# Patient Record
Sex: FEMALE | Race: OTHER | URBAN - METROPOLITAN AREA
[De-identification: names, ages, dates, MRNs, and addresses within clinical notes are randomized per-mention and may not be internally consistent; named-entity substitution may affect disease eponyms.]

---

## 2019-07-17 ENCOUNTER — HOSPITAL ENCOUNTER (OUTPATIENT)
Age: 59
Setting detail: SPECIMEN
Discharge: HOME | End: 2019-07-17

## 2019-07-17 LAB
DIRECT EXAM: NORMAL
Lab: NORMAL
SPECIMEN DESCRIPTION: NORMAL

## 2019-07-19 LAB
HPV SAMPLE: ABNORMAL
HPV, GENOTYPE 16: NOT DETECTED
HPV, GENOTYPE 18: NOT DETECTED
HPV, HIGH RISK OTHER: DETECTED
HPV, INTERPRETATION: ABNORMAL
SPECIMEN DESCRIPTION: ABNORMAL

## 2019-07-23 LAB — CYTOLOGY REPORT: NORMAL

## 2019-08-02 ENCOUNTER — HOSPITAL ENCOUNTER (EMERGENCY)
Age: 59
Discharge: HOME | End: 2019-08-02

## 2019-08-02 VITALS
WEIGHT: 123 LBS | SYSTOLIC BLOOD PRESSURE: 116 MMHG | TEMPERATURE: 97.7 F | DIASTOLIC BLOOD PRESSURE: 65 MMHG | RESPIRATION RATE: 14 BRPM | OXYGEN SATURATION: 99 % | HEART RATE: 58 BPM | HEIGHT: 60 IN | BODY MASS INDEX: 24.15 KG/M2

## 2019-08-02 DIAGNOSIS — M79.2 NEURALGIA: Primary | ICD-10-CM

## 2019-08-02 PROCEDURE — 99282 EMERGENCY DEPT VISIT SF MDM: CPT

## 2019-08-02 RX ORDER — NAPROXEN 500 MG/1
500 TABLET ORAL 2 TIMES DAILY
Qty: 60 TABLET | Refills: 0 | Status: SHIPPED | OUTPATIENT
Start: 2019-08-02 | End: 2022-08-30 | Stop reason: ALTCHOICE

## 2019-08-02 ASSESSMENT — ENCOUNTER SYMPTOMS
SORE THROAT: 1
EYE ITCHING: 0
RHINORRHEA: 0
NAUSEA: 0
FACIAL SWELLING: 0
TROUBLE SWALLOWING: 1
EYE DISCHARGE: 0
VOICE CHANGE: 0
VOMITING: 0
PHOTOPHOBIA: 0
EYE PAIN: 0

## 2019-08-02 NOTE — ED PROVIDER NOTES
[facility]  eMERGENCY dEPARTMENT eNCOUnter          CHIEF COMPLAINT       Chief Complaint   Patient presents with    Eye Problem       Nurses Notes reviewed and I agree except as noted in the HPI. HISTORY OF PRESENT ILLNESS    Dilia Hager is a 62 y.o. female who presents to the Emergency Department for the evaluation of pain below the left eye. She states that she has a cyst removed from the left eye 1 week ago and has been experiencing pain ever since. She also admits to some decreased vision from the left eye as well as a headache. She also complains of a sore throat and difficulty swallowing. She denies taking any pain medications prior to arrival.       The HPI was provided by the patient. REVIEW OF SYSTEMS     Review of Systems   Constitutional: Negative for chills, diaphoresis, fatigue and fever. HENT: Positive for sore throat and trouble swallowing. Negative for dental problem, ear discharge, ear pain, facial swelling, mouth sores, postnasal drip, rhinorrhea and voice change. Eyes: Positive for visual disturbance (Decreased vision). Negative for photophobia, pain, discharge and itching. Pain below to the left eye   Gastrointestinal: Negative for nausea and vomiting. Neurological: Positive for headaches. Negative for dizziness and syncope. PAST MEDICAL HISTORY    has no past medical history on file. SURGICAL HISTORY      has no past surgical history on file. CURRENT MEDICATIONS       Discharge Medication List as of 8/2/2019  7:02 PM          ALLERGIES     has No Known Allergies. FAMILY HISTORY     has no family status information on file. family history is not on file. SOCIAL HISTORY          PHYSICAL EXAM     INITIAL VITALS:  height is 5' (1.524 m) and weight is 123 lb (55.8 kg). Her oral temperature is 97.7 °F (36.5 °C). Her blood pressure is 116/65 and her pulse is 58. Her respiration is 14 and oxygen saturation is 99%.     Physical

## 2019-10-01 ENCOUNTER — NURSE ONLY (OUTPATIENT)
Dept: LAB | Age: 59
End: 2019-10-01

## 2019-10-01 ENCOUNTER — OFFICE VISIT (OUTPATIENT)
Dept: FAMILY MEDICINE CLINIC | Age: 59
End: 2019-10-01

## 2019-10-01 VITALS
RESPIRATION RATE: 12 BRPM | HEIGHT: 61 IN | SYSTOLIC BLOOD PRESSURE: 110 MMHG | DIASTOLIC BLOOD PRESSURE: 60 MMHG | OXYGEN SATURATION: 98 % | BODY MASS INDEX: 24.2 KG/M2 | WEIGHT: 128.2 LBS | HEART RATE: 52 BPM

## 2019-10-01 DIAGNOSIS — Z13.220 ENCOUNTER FOR SCREENING FOR LIPID DISORDER: ICD-10-CM

## 2019-10-01 DIAGNOSIS — R10.11 RUQ ABDOMINAL PAIN: ICD-10-CM

## 2019-10-01 DIAGNOSIS — E55.9 VITAMIN D DEFICIENCY: ICD-10-CM

## 2019-10-01 DIAGNOSIS — K90.89 OTHER INTESTINAL MALABSORPTION: ICD-10-CM

## 2019-10-01 DIAGNOSIS — G89.29 CHRONIC PAIN OF BOTH KNEES: ICD-10-CM

## 2019-10-01 DIAGNOSIS — R10.9 CHRONIC ABDOMINAL PAIN: ICD-10-CM

## 2019-10-01 DIAGNOSIS — G89.29 CHRONIC ABDOMINAL PAIN: ICD-10-CM

## 2019-10-01 DIAGNOSIS — M25.561 CHRONIC PAIN OF BOTH KNEES: ICD-10-CM

## 2019-10-01 DIAGNOSIS — R09.89 GLOBUS SENSATION: ICD-10-CM

## 2019-10-01 DIAGNOSIS — Z01.89 ENCOUNTER FOR ROUTINE LABORATORY TESTING: ICD-10-CM

## 2019-10-01 DIAGNOSIS — R13.10 DYSPHAGIA, UNSPECIFIED TYPE: ICD-10-CM

## 2019-10-01 DIAGNOSIS — Z13.29 THYROID DISORDER SCREENING: ICD-10-CM

## 2019-10-01 DIAGNOSIS — M25.562 CHRONIC PAIN OF BOTH KNEES: ICD-10-CM

## 2019-10-01 DIAGNOSIS — R13.10 DYSPHAGIA, UNSPECIFIED TYPE: Primary | ICD-10-CM

## 2019-10-01 LAB
ALBUMIN SERPL-MCNC: 4.6 G/DL (ref 3.5–5.1)
ALP BLD-CCNC: 55 U/L (ref 38–126)
ALT SERPL-CCNC: 16 U/L (ref 11–66)
AMYLASE: 80 U/L (ref 20–104)
ANION GAP SERPL CALCULATED.3IONS-SCNC: 13 MEQ/L (ref 8–16)
AST SERPL-CCNC: 24 U/L (ref 5–40)
BASOPHILS # BLD: 1.4 %
BASOPHILS ABSOLUTE: 0.1 THOU/MM3 (ref 0–0.1)
BILIRUB SERPL-MCNC: 0.3 MG/DL (ref 0.3–1.2)
BUN BLDV-MCNC: 9 MG/DL (ref 7–22)
CALCIUM SERPL-MCNC: 9.5 MG/DL (ref 8.5–10.5)
CHLORIDE BLD-SCNC: 104 MEQ/L (ref 98–111)
CHOLESTEROL, TOTAL: 225 MG/DL (ref 100–199)
CO2: 26 MEQ/L (ref 23–33)
CREAT SERPL-MCNC: 0.5 MG/DL (ref 0.4–1.2)
EOSINOPHIL # BLD: 2.7 %
EOSINOPHILS ABSOLUTE: 0.1 THOU/MM3 (ref 0–0.4)
ERYTHROCYTE [DISTWIDTH] IN BLOOD BY AUTOMATED COUNT: 13.8 % (ref 11.5–14.5)
ERYTHROCYTE [DISTWIDTH] IN BLOOD BY AUTOMATED COUNT: 47.2 FL (ref 35–45)
GFR SERPL CREATININE-BSD FRML MDRD: > 90 ML/MIN/1.73M2
GLUCOSE BLD-MCNC: 103 MG/DL (ref 70–108)
HCT VFR BLD CALC: 39.3 % (ref 37–47)
HDLC SERPL-MCNC: 44 MG/DL
HEMOGLOBIN: 12.9 GM/DL (ref 12–16)
IMMATURE GRANS (ABS): 0.01 THOU/MM3 (ref 0–0.07)
IMMATURE GRANULOCYTES: 0.2 %
LDL CHOLESTEROL CALCULATED: 144 MG/DL
LIPASE: 24.4 U/L (ref 5.6–51.3)
LYMPHOCYTES # BLD: 47.1 %
LYMPHOCYTES ABSOLUTE: 2.3 THOU/MM3 (ref 1–4.8)
MAGNESIUM: 2.4 MG/DL (ref 1.6–2.4)
MCH RBC QN AUTO: 30.9 PG (ref 26–33)
MCHC RBC AUTO-ENTMCNC: 32.8 GM/DL (ref 32.2–35.5)
MCV RBC AUTO: 94 FL (ref 81–99)
MONOCYTES # BLD: 9.7 %
MONOCYTES ABSOLUTE: 0.5 THOU/MM3 (ref 0.4–1.3)
NUCLEATED RED BLOOD CELLS: 0 /100 WBC
PLATELET # BLD: 291 THOU/MM3 (ref 130–400)
PMV BLD AUTO: 11.7 FL (ref 9.4–12.4)
POTASSIUM SERPL-SCNC: 3.9 MEQ/L (ref 3.5–5.2)
RBC # BLD: 4.18 MILL/MM3 (ref 4.2–5.4)
SEG NEUTROPHILS: 38.9 %
SEGMENTED NEUTROPHILS ABSOLUTE COUNT: 1.9 THOU/MM3 (ref 1.8–7.7)
SODIUM BLD-SCNC: 143 MEQ/L (ref 135–145)
TOTAL PROTEIN: 7.2 G/DL (ref 6.1–8)
TRIGL SERPL-MCNC: 185 MG/DL (ref 0–199)
TSH SERPL DL<=0.05 MIU/L-ACNC: 2.74 UIU/ML (ref 0.4–4.2)
VITAMIN D 25-HYDROXY: 16 NG/ML (ref 30–100)
WBC # BLD: 4.8 THOU/MM3 (ref 4.8–10.8)

## 2019-10-01 PROCEDURE — 99203 OFFICE O/P NEW LOW 30 MIN: CPT | Performed by: NURSE PRACTITIONER

## 2019-10-01 RX ORDER — OMEPRAZOLE 20 MG/1
20 CAPSULE, DELAYED RELEASE ORAL DAILY
Qty: 90 CAPSULE | Refills: 1 | Status: SHIPPED | OUTPATIENT
Start: 2019-10-01 | End: 2020-01-10 | Stop reason: SDUPTHER

## 2019-10-01 SDOH — HEALTH STABILITY: MENTAL HEALTH: HOW OFTEN DO YOU HAVE A DRINK CONTAINING ALCOHOL?: NEVER

## 2019-10-01 ASSESSMENT — PATIENT HEALTH QUESTIONNAIRE - PHQ9
SUM OF ALL RESPONSES TO PHQ QUESTIONS 1-9: 0
SUM OF ALL RESPONSES TO PHQ QUESTIONS 1-9: 0
1. LITTLE INTEREST OR PLEASURE IN DOING THINGS: 0
SUM OF ALL RESPONSES TO PHQ9 QUESTIONS 1 & 2: 0
2. FEELING DOWN, DEPRESSED OR HOPELESS: 0

## 2019-10-01 ASSESSMENT — ENCOUNTER SYMPTOMS
ABDOMINAL DISTENTION: 0
COUGH: 0
RHINORRHEA: 0
SHORTNESS OF BREATH: 0
NAUSEA: 0
BACK PAIN: 0
ABDOMINAL PAIN: 1
RECTAL PAIN: 0
DIARRHEA: 0
VOMITING: 0
CONSTIPATION: 1
BLOOD IN STOOL: 0
SORE THROAT: 0
TROUBLE SWALLOWING: 0

## 2019-10-03 LAB — THYROXINE (T4): 7.2 UG/DL (ref 4.5–12)

## 2019-10-10 ENCOUNTER — TELEPHONE (OUTPATIENT)
Dept: FAMILY MEDICINE CLINIC | Age: 59
End: 2019-10-10

## 2020-01-10 ENCOUNTER — HOSPITAL ENCOUNTER (OUTPATIENT)
Age: 60
Discharge: HOME | End: 2020-01-10

## 2020-01-10 ENCOUNTER — HOSPITAL ENCOUNTER (OUTPATIENT)
Dept: GENERAL RADIOLOGY | Age: 60
Discharge: HOME | End: 2020-01-10

## 2020-01-10 ENCOUNTER — OFFICE VISIT (OUTPATIENT)
Dept: FAMILY MEDICINE CLINIC | Age: 60
End: 2020-01-10

## 2020-01-10 VITALS
OXYGEN SATURATION: 100 % | WEIGHT: 131.2 LBS | SYSTOLIC BLOOD PRESSURE: 98 MMHG | HEIGHT: 61 IN | HEART RATE: 61 BPM | RESPIRATION RATE: 16 BRPM | TEMPERATURE: 97.9 F | DIASTOLIC BLOOD PRESSURE: 62 MMHG | BODY MASS INDEX: 24.77 KG/M2

## 2020-01-10 PROCEDURE — 74018 RADEX ABDOMEN 1 VIEW: CPT

## 2020-01-10 PROCEDURE — 99214 OFFICE O/P EST MOD 30 MIN: CPT | Performed by: FAMILY MEDICINE

## 2020-01-10 RX ORDER — OMEPRAZOLE 20 MG/1
20 CAPSULE, DELAYED RELEASE ORAL DAILY
Qty: 90 CAPSULE | Refills: 1 | Status: SHIPPED | OUTPATIENT
Start: 2020-01-10 | End: 2022-06-28 | Stop reason: ALTCHOICE

## 2020-01-10 ASSESSMENT — ENCOUNTER SYMPTOMS
ABDOMINAL PAIN: 1
CONSTIPATION: 0
SHORTNESS OF BREATH: 0
BLOOD IN STOOL: 0
TROUBLE SWALLOWING: 1
COUGH: 0
EYE PAIN: 0
DIARRHEA: 0
VOMITING: 0
NAUSEA: 0

## 2020-01-10 ASSESSMENT — PATIENT HEALTH QUESTIONNAIRE - PHQ9
SUM OF ALL RESPONSES TO PHQ QUESTIONS 1-9: 0
1. LITTLE INTEREST OR PLEASURE IN DOING THINGS: 0
SUM OF ALL RESPONSES TO PHQ9 QUESTIONS 1 & 2: 0
SUM OF ALL RESPONSES TO PHQ QUESTIONS 1-9: 0
2. FEELING DOWN, DEPRESSED OR HOPELESS: 0

## 2020-01-10 NOTE — PROGRESS NOTES
S: 62 y.o. female with   Chief Complaint   Patient presents with    Abdominal Pain     Patient here to discuss abdominal pains and also having a throat issue while lying on back feels like there is a puncture or hole at roof of mouth       HPI: chornic abd pain x 10 yrs. Colonoscopy unclear of results. Possible ascending colitis. Was told to repeat colonoscopy in 5 yrs.  kub and gi referral ordered but not done. Tenderness. No change with eating. No meds for pain. Right paracentral abd pain. Karen 8 years ago. Avoids meat. Mostly eats avacado, lettuce, and beans. BP Readings from Last 3 Encounters:   01/10/20 98/62   10/01/19 110/60     Wt Readings from Last 3 Encounters:   01/10/20 131 lb 3.2 oz (59.5 kg)   10/01/19 128 lb 3.2 oz (58.2 kg)           O: VS:  height is 5' 0.5\" (1.537 m) and weight is 131 lb 3.2 oz (59.5 kg). Her oral temperature is 97.9 °F (36.6 °C). Her blood pressure is 98/62 and her pulse is 61. Her respiration is 16 and oxygen saturation is 100%. AAO/NAD, appropriate affect for mood  CV:  RRR, no murmur  Resp: CTAB  RUQ TTP. No rebound. Soft abd. Diagnosis Orders   1. Right upper quadrant abdominal pain         Plan:  KUB, GI referral for colonoscopy. Start omeprazole for tx and dx potential.  F/u 1 month. Possible neurological pain. Consider topamax or elavil in the future if EGD, colonoscopy, kub, and omeprazole don't elucidate an answer. Health Maintenance Due   Topic Date Due    Hepatitis C screen  1960    DTaP/Tdap/Td vaccine (1 - Tdap) 07/10/1969    HIV screen  07/10/1973    Cervical cancer screen  07/10/1979    Diabetes screen  07/10/1998    Breast cancer screen  07/10/2008    Shingles Vaccine (1 of 2) 07/10/2008    Colon cancer screen colonoscopy  07/10/2008    Flu vaccine (1) 09/01/2019         Attending Physician Statement  I have discussed the case, including pertinent history and exam findings with the resident.  I also have seen the patient and performed bain portions of the examination. I agree with the documented assessment and plan as documented by the resident.   GE modifier added to this encounter      Payal Slaughter DO 1/10/2020 11:21 AM

## 2020-01-10 NOTE — PROGRESS NOTES
[address] [facility] [address]  Dept: [phone]  Dept Fax: [phone]  Loc: [phone]      July Hart is a 62 y.o. female who presents today for:  Chief Complaint   Patient presents with    Abdominal Pain     Patient here to discuss abdominal pains and also having a throat issue while lying on back feels like there is a puncture or hole at roof of mouth       Goals      Exercise 3x per week (30 min per time)           HPI:     July Hart is a 62 y.o. female who has no past medical history who presents today for follow up. She is a Maori speaking individual and communication is established through an interpretor. Stomach pain on right side for 10 years, Everyday, Constant, does not change with food or eating. States that her inflammation goes all the way from her pelvis to her bra. 5 years ago she states that she had a colonoscopy which showed colon inflammation and she was recommended to get a repeat colonoscopy in 5 years. She states that her diet is fairly balanced and that she usually eats avocados, beans, and lettuce. She states that she had her gall bladder taken out around 8 years ago. She states that she has a BM once a day and that she doesn't notice any change in her stools. Not more firm or loose. No pain on the left side of her abdomen or pain with BM. Was last seen in October for this same issue, at that time KUB was ordered and she was referred to GI. She was not seen for either of those appointments likely due to lack of communication. She also notes that she does have some difficulty swallowing some foods and has had that issue for quite some time. Did not try omeprazole that was prescribed at the last visit. History reviewed. No pertinent past medical history. History reviewed. No pertinent surgical history. History reviewed. No pertinent family history.   Social kg)     BP Readings from Last 3 Encounters:   01/10/20 98/62   10/01/19 110/60       Physical Exam  Vitals signs and nursing note reviewed. Constitutional:       General: She is not in acute distress. Appearance: She is well-developed. She is not diaphoretic. HENT:      Head: Normocephalic and atraumatic. Right Ear: External ear normal.      Left Ear: External ear normal.      Nose: Nose normal.   Eyes:      General: No scleral icterus. Right eye: No discharge. Left eye: No discharge. Conjunctiva/sclera: Conjunctivae normal.   Neck:      Musculoskeletal: Normal range of motion. Cardiovascular:      Rate and Rhythm: Normal rate and regular rhythm. Heart sounds: Normal heart sounds. No murmur. Pulmonary:      Effort: Pulmonary effort is normal.      Breath sounds: Normal breath sounds. Abdominal:      General: Abdomen is flat. Bowel sounds are normal. There is no distension. Palpations: There is no mass. Tenderness: There is tenderness. There is guarding. Hernia: No hernia is present. Skin:     General: Skin is warm and dry. Findings: No erythema or rash. Neurological:      Mental Status: She is alert and oriented to person, place, and time. Cranial Nerves: No cranial nerve deficit. Psychiatric:         Behavior: Behavior normal.         Thought Content:  Thought content normal.         Judgment: Judgment normal.         Lab Results   Component Value Date    WBC 4.8 10/01/2019    HGB 12.9 10/01/2019    HCT 39.3 10/01/2019     10/01/2019    CHOL 225 (H) 10/01/2019    TRIG 185 10/01/2019    HDL 44 10/01/2019    LDLCALC 144 10/01/2019    AST 24 10/01/2019     10/01/2019    K 3.9 10/01/2019     10/01/2019    CREATININE 0.5 10/01/2019    BUN 9 10/01/2019    CO2 26 10/01/2019    TSH 2.740 10/01/2019    LABGLOM >90 10/01/2019    MG 2.4 10/01/2019    CALCIUM 9.5 10/01/2019    VITD25 16 (L) 10/01/2019       Imaging Results:    No

## 2020-01-17 ENCOUNTER — TELEPHONE (OUTPATIENT)
Dept: FAMILY MEDICINE CLINIC | Age: 60
End: 2020-01-17

## 2020-01-17 NOTE — TELEPHONE ENCOUNTER
----- Message from Emanuel Hendrix DO sent at 1/17/2020 11:14 AM EST -----  XR is generally normal. May be having some constipation. Try dietary modifications with prune juice or OTC medications such as fiber or miralax.

## 2020-01-17 NOTE — TELEPHONE ENCOUNTER
Call to patient, no answer, unable to lm d/t vm not set up  No f/u appt scheduled   Letter mailed to patient

## 2020-01-17 NOTE — LETTER
[address] [place] [address] [address] [facility] [address]  [facility] [address]  Phone: [phone]  Fax: Ricardo Mann, DO        January 17, 2020      Our office has been trying to contact you pertaining to your most recent test results. Please contact our office at your first convenience.  You can reach us at [phone] Monday through Friday from 8:00 am to 4:00 pm.          Sincerely,        [facility], DO

## 2020-01-23 ENCOUNTER — TELEPHONE (OUTPATIENT)
Dept: FAMILY MEDICINE CLINIC | Age: 60
End: 2020-01-23

## 2020-01-23 NOTE — TELEPHONE ENCOUNTER
Chhaya from [facility] Lm on nurse line asking if this office has any english speaking contacts listed in this patient's chart besides the information they have. I returned Chhaya's call and lm at her ext.

## 2022-06-28 ENCOUNTER — HOSPITAL ENCOUNTER (OUTPATIENT)
Age: 62
Setting detail: SPECIMEN
Discharge: HOME | End: 2022-06-28

## 2022-06-28 ENCOUNTER — ANCILLARY PROCEDURE (OUTPATIENT)
Dept: FAMILY MEDICINE CLINIC | Age: 62
End: 2022-06-28

## 2022-06-28 ENCOUNTER — OFFICE VISIT (OUTPATIENT)
Dept: FAMILY MEDICINE CLINIC | Age: 62
End: 2022-06-28

## 2022-06-28 VITALS
OXYGEN SATURATION: 98 % | DIASTOLIC BLOOD PRESSURE: 80 MMHG | TEMPERATURE: 97.4 F | SYSTOLIC BLOOD PRESSURE: 138 MMHG | BODY MASS INDEX: 25.57 KG/M2 | WEIGHT: 135.4 LBS | HEIGHT: 61 IN | HEART RATE: 58 BPM | RESPIRATION RATE: 16 BRPM

## 2022-06-28 DIAGNOSIS — G89.29 CHRONIC PAIN OF LEFT KNEE: ICD-10-CM

## 2022-06-28 DIAGNOSIS — Z00.00 ENCOUNTER FOR WELLNESS EXAMINATION IN ADULT: Primary | ICD-10-CM

## 2022-06-28 DIAGNOSIS — E55.9 VITAMIN D DEFICIENCY: ICD-10-CM

## 2022-06-28 DIAGNOSIS — M25.562 CHRONIC PAIN OF LEFT KNEE: ICD-10-CM

## 2022-06-28 DIAGNOSIS — Z00.00 ENCOUNTER FOR WELLNESS EXAMINATION IN ADULT: ICD-10-CM

## 2022-06-28 DIAGNOSIS — R06.83 SNORING: ICD-10-CM

## 2022-06-28 DIAGNOSIS — Z12.11 COLON CANCER SCREENING: ICD-10-CM

## 2022-06-28 PROCEDURE — 73562 X-RAY EXAM OF KNEE 3: CPT

## 2022-06-28 PROCEDURE — 73562 X-RAY EXAM OF KNEE 3: CPT | Performed by: NURSE PRACTITIONER

## 2022-06-28 PROCEDURE — 36415 COLL VENOUS BLD VENIPUNCTURE: CPT | Performed by: STUDENT IN AN ORGANIZED HEALTH CARE EDUCATION/TRAINING PROGRAM

## 2022-06-28 PROCEDURE — 99396 PREV VISIT EST AGE 40-64: CPT | Performed by: STUDENT IN AN ORGANIZED HEALTH CARE EDUCATION/TRAINING PROGRAM

## 2022-06-28 SDOH — ECONOMIC STABILITY: FOOD INSECURITY: WITHIN THE PAST 12 MONTHS, YOU WORRIED THAT YOUR FOOD WOULD RUN OUT BEFORE YOU GOT MONEY TO BUY MORE.: NEVER TRUE

## 2022-06-28 SDOH — ECONOMIC STABILITY: TRANSPORTATION INSECURITY
IN THE PAST 12 MONTHS, HAS LACK OF TRANSPORTATION KEPT YOU FROM MEETINGS, WORK, OR FROM GETTING THINGS NEEDED FOR DAILY LIVING?: YES

## 2022-06-28 SDOH — ECONOMIC STABILITY: TRANSPORTATION INSECURITY
IN THE PAST 12 MONTHS, HAS THE LACK OF TRANSPORTATION KEPT YOU FROM MEDICAL APPOINTMENTS OR FROM GETTING MEDICATIONS?: YES

## 2022-06-28 SDOH — ECONOMIC STABILITY: FOOD INSECURITY: WITHIN THE PAST 12 MONTHS, THE FOOD YOU BOUGHT JUST DIDN'T LAST AND YOU DIDN'T HAVE MONEY TO GET MORE.: NEVER TRUE

## 2022-06-28 ASSESSMENT — ENCOUNTER SYMPTOMS
TROUBLE SWALLOWING: 0
VOMITING: 0
SHORTNESS OF BREATH: 0
COUGH: 0
ABDOMINAL PAIN: 0
NAUSEA: 0

## 2022-06-28 ASSESSMENT — PATIENT HEALTH QUESTIONNAIRE - PHQ9
SUM OF ALL RESPONSES TO PHQ QUESTIONS 1-9: 0
1. LITTLE INTEREST OR PLEASURE IN DOING THINGS: 0
SUM OF ALL RESPONSES TO PHQ QUESTIONS 1-9: 0
SUM OF ALL RESPONSES TO PHQ9 QUESTIONS 1 & 2: 0
2. FEELING DOWN, DEPRESSED OR HOPELESS: 0
SUM OF ALL RESPONSES TO PHQ QUESTIONS 1-9: 0
SUM OF ALL RESPONSES TO PHQ QUESTIONS 1-9: 0

## 2022-06-28 ASSESSMENT — SOCIAL DETERMINANTS OF HEALTH (SDOH): HOW HARD IS IT FOR YOU TO PAY FOR THE VERY BASICS LIKE FOOD, HOUSING, MEDICAL CARE, AND HEATING?: NOT HARD AT ALL

## 2022-06-28 NOTE — PROGRESS NOTES
[address] [facility],[address] [facility], [facility]. [place] [address]  Dept: [phone]  Dept Fax: [phone]: [phone]  Loc Fax: [phone]    Mc Toney is a 59 y.o. female who presents today for her medical conditions/complaints as noted below. Chief Complaint   Patient presents with    New Patient     [facility]- Previous [facility] CNP patient    Knee Pain     Left Knee x 3 years off an on. back of leg and knee--Right knee also swollen and some pain. Taking Advil    Other      ID: [id number]       HPI:     Visit completed with assistance from [facility] [place] interpretor - needs  for all communication. Patient is here in the office today to establish care. Patient complains of chronic left knee pain and snoring/sinus issues. Left knee pain:  Patient states that she has had left knee pain for 3 years. Pain is constant but does get worse at times-unable to tell me exactly what makes the pain worse. Her pain is located across the top of her knee and extends down below. Denies any clicking, popping, catching, or instability. States that she does have some swelling that occurs from time to time, but denies any redness. She does take Advil occasionally which does help. Denies any known injury or trauma. Has not had x-rays in the past.    Snoring, sinus issues:  Patient states that she has issues with snoring and difficulty breathing due to sinus congestion when lying on her back flat. States that this also happens while awake during the day. She denies any trouble swallowing. She does not use any nasal sprays. Has not had a sleep study. States that she thinks it is due to something in her throat and would like to see a specialist for evaluation. Past medical history: low vitamin D, chronic left knee pain    Surgeries: cholecystectomy    Social history:   Patient lives in [place] with her daughter.   Moved recently She is well-developed and overweight. She is not ill-appearing or toxic-appearing. HENT:      Head: Normocephalic and atraumatic. Right Ear: Tympanic membrane, ear canal and external ear normal.      Left Ear: Tympanic membrane, ear canal and external ear normal.      Nose: Nose normal.      Right Turbinates: Not enlarged. Left Turbinates: Not enlarged. Mouth/Throat:      Lips: Pink. Mouth: Mucous membranes are moist.      Dentition: Has dentures. Pharynx: Oropharynx is clear. Uvula midline. Eyes:      General: Lids are normal.      Conjunctiva/sclera: Conjunctivae normal.      Pupils: Pupils are equal, round, and reactive to light. Neck:      Thyroid: No thyromegaly. Cardiovascular:      Rate and Rhythm: Normal rate and regular rhythm. Pulses:           Radial pulses are 2+ on the right side and 2+ on the left side. Heart sounds: Normal heart sounds. No murmur heard. Pulmonary:      Effort: Pulmonary effort is normal.      Breath sounds: Normal breath sounds and air entry. No wheezing, rhonchi or rales. Abdominal:      General: Abdomen is flat. Bowel sounds are normal. There is no distension. Palpations: Abdomen is soft. Tenderness: There is no abdominal tenderness. Musculoskeletal:      Cervical back: Neck supple. Right knee: Normal.      Left knee: No swelling, effusion or erythema. Tenderness present over the medial joint line and lateral joint line. No LCL laxity or MCL laxity. Instability Tests: Anterior drawer test negative. Posterior drawer test negative. Lymphadenopathy:      Cervical: No cervical adenopathy. Skin:     General: Skin is warm and dry. Capillary Refill: Capillary refill takes less than 2 seconds. Neurological:      Mental Status: She is alert and oriented to person, place, and time. Gait: Gait is intact.    Psychiatric:         Mood and Affect: Mood and affect normal.         Behavior: Behavior is cooperative. /80 (Site: Left Upper Arm, Position: Sitting, Cuff Size: Medium Adult) Comment: manual  Pulse 58   Temp 97.4 °F (36.3 °C) (Temporal)   Resp 16   Ht 5' 1\" (1.549 m)   Wt 135 lb 6.4 oz (61.4 kg)   LMP  (LMP Unknown)   SpO2 98%   BMI 25.58 kg/m²     Assessment/Plan:   Adry Riojas was seen today for new patient, knee pain and other. Diagnoses and all orders for this visit:    Encounter for wellness examination in adult  Comments:  Well-appearing 59year old female. Declines HIV, hep C screening. Declines Pap exam, mammogram today. Referral placed to GI for screening colonscopy. Lab work ordered - will complete today. Orders:  -     Comprehensive Metabolic Panel; Future  -     Lipid Panel; Future    Vitamin D deficiency  Comments:  History of low vitamin D. Recheck vitamin D level at this time. Not currently on replacement. Orders:  -     Vitamin D 25 Hydroxy; Future    Chronic pain of left knee  Comments:  Chronic, uncontrolled. Atraumatic. Likely secondary to OA. Obtain baseline knee xray at this time. Continue NSAIDs as needed for pain. Orders:  -     Cancel: XR KNEE LEFT (1-2 VIEWS); Future    Snoring  Comments:  Chronic, uncontrolled. Physical exam unremarkable today. Patient declines sleep study at this time but requests referral to specialist for further evaluation. Orders:  -     Amor Denise MD, Otolaryngology, [facility]    Colon cancer screening  Comments:  Referral to GI placed for screening colonoscopy. Orders:  -     Mignon Perez MD, Gastroenterology, [facility]      Return in about 4 weeks (around 7/26/2022) for follow up on lab work.     Electronically signed by Renny Kapadia DO on 6/28/2022 at 4:17 PM

## 2022-06-29 LAB
ALBUMIN SERPL-MCNC: 4.6 G/DL (ref 3.5–5.2)
ALBUMIN/GLOBULIN RATIO: 1.7 (ref 1–2.5)
ALP BLD-CCNC: 70 U/L (ref 35–104)
ALT SERPL-CCNC: 19 U/L (ref 5–33)
ANION GAP SERPL CALCULATED.3IONS-SCNC: 12 MMOL/L (ref 9–17)
AST SERPL-CCNC: 25 U/L
BILIRUB SERPL-MCNC: 0.3 MG/DL (ref 0.3–1.2)
BUN BLDV-MCNC: 10 MG/DL (ref 8–23)
CALCIUM SERPL-MCNC: 10 MG/DL (ref 8.6–10.4)
CHLORIDE BLD-SCNC: 102 MMOL/L (ref 98–107)
CHOLESTEROL/HDL RATIO: 5.5
CHOLESTEROL: 237 MG/DL
CO2: 27 MMOL/L (ref 20–31)
CREAT SERPL-MCNC: 0.57 MG/DL (ref 0.5–0.9)
GFR AFRICAN AMERICAN: >60 ML/MIN
GFR NON-AFRICAN AMERICAN: >60 ML/MIN
GFR SERPL CREATININE-BSD FRML MDRD: NORMAL ML/MIN/{1.73_M2}
GLUCOSE BLD-MCNC: 88 MG/DL (ref 70–99)
HDLC SERPL-MCNC: 43 MG/DL
LDL CHOLESTEROL: 154 MG/DL (ref 0–130)
POTASSIUM SERPL-SCNC: 4.7 MMOL/L (ref 3.7–5.3)
SODIUM BLD-SCNC: 141 MMOL/L (ref 135–144)
TOTAL PROTEIN: 7.3 G/DL (ref 6.4–8.3)
TRIGL SERPL-MCNC: 202 MG/DL
VITAMIN D 25-HYDROXY: 13.4 NG/ML

## 2022-07-14 PROBLEM — E55.9 VITAMIN D DEFICIENCY: Status: ACTIVE | Noted: 2022-07-14

## 2022-07-14 PROBLEM — E78.2 MIXED HYPERLIPIDEMIA: Status: ACTIVE | Noted: 2022-07-14

## 2022-07-14 NOTE — PROGRESS NOTES
[address] [facility],[address] FAMILY MEDICINE, [facility]. [facility] [address]  Dept: [phone]  Dept Fax: : [phone]  [facility] Fax: [phone]    Alexa Austin is a 57 y.o. female who presents today for her medical conditions/complaints as noted below. Chief Complaint   Patient presents with    Results     Recent labs done 06/28/22  Xray done 06/28/22         HPI:     Patient presents office today for a follow-up on labs and x-ray results. Labs show elevated cholesterol and low vitamin D. Left knee x-ray showed mild bicompartmental arthritic changes. Hyperlipidemia:  Patient has not been taking any cholesterol medication. ASCVD risk is 7.1%. Patient agreeable to starting medication at this time. Vitamin D deficiency:  Vitamin D level was low at 13. She is not currently taking any supplementation. Interested in starting vitamin D supplement at this time. Left knee pain/osteoarthritis:  Patient states that she continues to have left knee pain that extends and involves her entire knee. Does have some swelling from time to time along the inner aspect. She does not take anything for her pain. Does not do any at home exercises/stretches. Has not done PT. GI referral: Gastroenterology office was contacted during this appointment and confirmed that patient was mailed a screening form that she must complete and return prior to scheduling an appointment. ENT: appt scheduled for 8/30/2022      History reviewed. No pertinent past medical history. History reviewed. No pertinent surgical history. History reviewed. No pertinent family history. Social History     Tobacco Use    Smoking status: Never    Smokeless tobacco: Never   Substance Use Topics    Alcohol use: Never      Current Outpatient Medications   Medication Sig Dispense Refill    vitamin D (CHOLECALCIFEROL) 50 MCG (2000 UT) TABS tablet Take 1 tablet by mouth in the morning.  27 tablet 2    atorvastatin (LIPITOR) 40 MG tablet Take 1 tablet by mouth in the morning. 30 tablet 2     No current facility-administered medications for this visit. No Known Allergies    Health Maintenance   Topic Date Due    COVID-19 Vaccine (1) Never done    DTaP/Tdap/Td vaccine (1 - Tdap) Never done    Cervical cancer screen  Never done    Colorectal Cancer Screen  Never done    Breast cancer screen  Never done    Shingles vaccine (1 of 2) Never done    Hepatitis C screen  06/28/2023 (Originally 7/10/1978)    HIV screen  06/28/2023 (Originally 7/10/1973)    Flu vaccine (1) 09/01/2022    Lipids  06/28/2023    Depression Screen  06/28/2023    Hepatitis A vaccine  Aged Out    Hepatitis B vaccine  Aged Out    Hib vaccine  Aged Out    Meningococcal (ACWY) vaccine  Aged Out    Pneumococcal 0-64 years Vaccine  Aged Out       Subjective:      Review of Systems   Constitutional:  Negative for chills and fever. Respiratory:  Negative for cough and shortness of breath. Cardiovascular:  Negative for chest pain. Gastrointestinal:  Negative for abdominal pain, nausea and vomiting. Musculoskeletal:  Positive for arthralgias (left knee pain) and joint swelling. Objective:     Physical Exam  Vitals and nursing note reviewed. Constitutional:       General: She is not in acute distress. Appearance: Normal appearance. She is well-developed. She is not ill-appearing or toxic-appearing. HENT:      Head: Normocephalic and atraumatic. Eyes:      General: Lids are normal.      Conjunctiva/sclera: Conjunctivae normal.   Cardiovascular:      Rate and Rhythm: Normal rate and regular rhythm. Pulses:           Radial pulses are 2+ on the right side and 2+ on the left side. Heart sounds: Normal heart sounds. No murmur heard. Pulmonary:      Effort: Pulmonary effort is normal.      Breath sounds: Normal breath sounds and air entry. No wheezing, rhonchi or rales.    Musculoskeletal:      Right lower leg: No edema. Left lower leg: No edema. Comments: Left knee: crepitus on exam, no appreciable swelling noted   Skin:     General: Skin is warm and dry. Capillary Refill: Capillary refill takes less than 2 seconds. Neurological:      General: No focal deficit present. Mental Status: She is alert and oriented to person, place, and time. Gait: Gait is intact. Psychiatric:         Mood and Affect: Mood and affect normal.         Behavior: Behavior is cooperative. /73   Pulse 60   Temp 98.4 °F (36.9 °C) (Temporal)   Resp 16   Wt 133 lb (60.3 kg)   LMP  (LMP Unknown)   SpO2 98%   BMI 25.13 kg/m²     Assessment/Plan:   Carl Lal was seen today for results. Diagnoses and all orders for this visit:    Mixed hyperlipidemia  Comments:  Chronic, uncontrolled. ASCVD risk is 7.1%. Plan to start atorvastatin 40 mg daily. Will plan to recheck lipid panel in 6 months. Orders:  -     atorvastatin (LIPITOR) 40 MG tablet; Take 1 tablet by mouth in the morning. Vitamin D deficiency  Comments:  Chronic, uncontrolled. Vitamin D low at 13. Start vitamin D 2000 units daily - Rx sent to pharmacy. Plan to recheck lab in 3-6 months. Orders:  -     vitamin D (CHOLECALCIFEROL) 50 MCG (2000 UT) TABS tablet; Take 1 tablet by mouth in the morning. Primary osteoarthritis of left knee  Comments:  New diagnosis, uncontrolled pain. Plan to start as needed NSAID (Aleve or Naproxen) OTC. At-home knee exercises and stretches provided in AVS. Contact office with any worsening symptoms or concerns. Return in about 3 months (around 10/15/2022) for follow up on medication.       Electronically signed by Shae Winters DO on 7/15/2022 at 12:21 PM

## 2022-07-15 ENCOUNTER — OFFICE VISIT (OUTPATIENT)
Dept: FAMILY MEDICINE CLINIC | Age: 62
End: 2022-07-15

## 2022-07-15 VITALS
WEIGHT: 133 LBS | OXYGEN SATURATION: 98 % | BODY MASS INDEX: 25.13 KG/M2 | SYSTOLIC BLOOD PRESSURE: 124 MMHG | TEMPERATURE: 98.4 F | RESPIRATION RATE: 16 BRPM | DIASTOLIC BLOOD PRESSURE: 73 MMHG | HEART RATE: 60 BPM

## 2022-07-15 DIAGNOSIS — M17.12 PRIMARY OSTEOARTHRITIS OF LEFT KNEE: ICD-10-CM

## 2022-07-15 DIAGNOSIS — E55.9 VITAMIN D DEFICIENCY: ICD-10-CM

## 2022-07-15 DIAGNOSIS — E78.2 MIXED HYPERLIPIDEMIA: Primary | ICD-10-CM

## 2022-07-15 PROCEDURE — 99214 OFFICE O/P EST MOD 30 MIN: CPT | Performed by: STUDENT IN AN ORGANIZED HEALTH CARE EDUCATION/TRAINING PROGRAM

## 2022-07-15 RX ORDER — CHOLECALCIFEROL (VITAMIN D3) 50 MCG
2000 TABLET ORAL DAILY
Qty: 30 TABLET | Refills: 2 | Status: SHIPPED | OUTPATIENT
Start: 2022-07-15 | End: 2022-08-10

## 2022-07-15 RX ORDER — ATORVASTATIN CALCIUM 40 MG/1
40 TABLET, FILM COATED ORAL DAILY
Qty: 30 TABLET | Refills: 2 | Status: SHIPPED | OUTPATIENT
Start: 2022-07-15 | End: 2022-08-10

## 2022-07-15 ASSESSMENT — ENCOUNTER SYMPTOMS
ABDOMINAL PAIN: 0
VOMITING: 0
NAUSEA: 0
COUGH: 0
SHORTNESS OF BREATH: 0

## 2022-08-10 DIAGNOSIS — E78.2 MIXED HYPERLIPIDEMIA: ICD-10-CM

## 2022-08-10 DIAGNOSIS — E55.9 VITAMIN D DEFICIENCY: ICD-10-CM

## 2022-08-10 RX ORDER — CHOLECALCIFEROL (VITAMIN D3) 125 MCG
CAPSULE ORAL
Qty: 30 TABLET | Refills: 2 | Status: SHIPPED | OUTPATIENT
Start: 2022-08-10

## 2022-08-10 RX ORDER — ATORVASTATIN CALCIUM 40 MG/1
TABLET, FILM COATED ORAL
Qty: 30 TABLET | Refills: 2 | Status: SHIPPED | OUTPATIENT
Start: 2022-08-10

## 2022-08-30 ENCOUNTER — OFFICE VISIT (OUTPATIENT)
Dept: ENT CLINIC | Age: 62
End: 2022-08-30

## 2022-08-30 VITALS
OXYGEN SATURATION: 97 % | HEART RATE: 55 BPM | TEMPERATURE: 97.1 F | DIASTOLIC BLOOD PRESSURE: 90 MMHG | BODY MASS INDEX: 25.11 KG/M2 | RESPIRATION RATE: 18 BRPM | SYSTOLIC BLOOD PRESSURE: 138 MMHG | WEIGHT: 132.9 LBS

## 2022-08-30 DIAGNOSIS — R06.83 SNORING: ICD-10-CM

## 2022-08-30 DIAGNOSIS — K08.109 EDENTULOUS: ICD-10-CM

## 2022-08-30 DIAGNOSIS — G47.30 OBSERVED SLEEP APNEA: Primary | ICD-10-CM

## 2022-08-30 PROCEDURE — 99203 OFFICE O/P NEW LOW 30 MIN: CPT | Performed by: OTOLARYNGOLOGY

## 2022-08-30 NOTE — PROGRESS NOTES
[address] EAR, NOSE AND THROAT  [address] [place] [address]  [address] [facility],[address]  Dept: [phone]  Dept Fax: [phone]  Loc: [phone]    Joanna Lugo is a 57 y.o. female who was referred byGerhard Crabtree DO for:  Chief Complaint   Patient presents with    New Patient     Pt is here for snoring   . HPI:     Joanna Lugo is a 57 y.o. female who presents today for difficulty with her sleep. She notes she snores. She has observed apneas. She is tired all the time. Tova Botello History:     No Known Allergies  Current Outpatient Medications   Medication Sig Dispense Refill    Cholecalciferol (VITAMIN D3) 50 MCG (2000 UT) TABS TOME MELL TABLETA TODOS LOS LONGO EN LA MANANA 30 tablet 2    atorvastatin (LIPITOR) 40 MG tablet TOME MELL TABLETA TODOS LOS LONGO EN LA MANANA 30 tablet 2     No current facility-administered medications for this visit. No past medical history on file. No past surgical history on file. No family history on file. Social History     Tobacco Use    Smoking status: Never    Smokeless tobacco: Never   Substance Use Topics    Alcohol use: Never       Subjective:      Review of Systems    Objective:   BP (!) 138/90 (Site: Right Upper Arm, Position: Sitting)   Pulse 55   Temp 97.1 °F (36.2 °C) (Infrared)   Resp 18   Wt 132 lb 14.4 oz (60.3 kg)   LMP  (LMP Unknown)   SpO2 97%   BMI 25.11 kg/m²     Physical Exam  Upper airway and ENT exam was essentially normal except for being edentulous. Data:  All of the past medical history, past surgical history, family history,social history, allergies and current medications were reviewed with the patient. Assessment & Plan   Diagnoses and all orders for this visit:     Diagnosis Orders   1. Observed sleep apnea  Ambulatory referral to Sleep Medicine      2. Snoring  Ambulatory referral to Sleep Medicine      3.  Edentulous  Ambulatory referral to Sleep Medicine The findings were explained and her questions were answered. Does not appear to have any specific easily fixed mass of upper airway obstruction. With her fatigue and history of observed apneas and snoring, I would recommend that she start with a sleep study. I discussed this with the patient through the . She agreed to try that. Herber Santoro. Myesha Riggs MD    **This report has been created using voice recognition software. It may contain minor errors which are inherent in voicerecognition technology. **

## 2023-01-12 ENCOUNTER — OFFICE VISIT (OUTPATIENT)
Dept: FAMILY MEDICINE CLINIC | Age: 63
End: 2023-01-12

## 2023-01-12 VITALS
WEIGHT: 135.8 LBS | HEIGHT: 61 IN | DIASTOLIC BLOOD PRESSURE: 70 MMHG | HEART RATE: 60 BPM | SYSTOLIC BLOOD PRESSURE: 110 MMHG | RESPIRATION RATE: 16 BRPM | OXYGEN SATURATION: 96 % | BODY MASS INDEX: 25.64 KG/M2

## 2023-01-12 DIAGNOSIS — R40.0 DAYTIME SOMNOLENCE: ICD-10-CM

## 2023-01-12 DIAGNOSIS — E55.9 VITAMIN D DEFICIENCY: ICD-10-CM

## 2023-01-12 DIAGNOSIS — R10.11 RIGHT UPPER QUADRANT ABDOMINAL PAIN: ICD-10-CM

## 2023-01-12 DIAGNOSIS — E78.2 MIXED HYPERLIPIDEMIA: Primary | ICD-10-CM

## 2023-01-12 LAB
ALBUMIN SERPL-MCNC: 4.5 G/DL (ref 3.5–5.1)
ALP BLD-CCNC: 73 U/L (ref 38–126)
ALT SERPL-CCNC: 20 U/L (ref 11–66)
ANION GAP SERPL CALCULATED.3IONS-SCNC: 11 MEQ/L (ref 8–16)
AST SERPL-CCNC: 23 U/L (ref 5–40)
BASOPHILS # BLD: 1.2 %
BASOPHILS ABSOLUTE: 0.1 THOU/MM3 (ref 0–0.1)
BILIRUB SERPL-MCNC: 0.4 MG/DL (ref 0.3–1.2)
BUN BLDV-MCNC: 12 MG/DL (ref 7–22)
CALCIUM SERPL-MCNC: 9.2 MG/DL (ref 8.5–10.5)
CHLORIDE BLD-SCNC: 104 MEQ/L (ref 98–111)
CHOLESTEROL, TOTAL: 235 MG/DL (ref 100–199)
CO2: 27 MEQ/L (ref 23–33)
CREAT SERPL-MCNC: 0.6 MG/DL (ref 0.4–1.2)
EOSINOPHIL # BLD: 2.6 %
EOSINOPHILS ABSOLUTE: 0.2 THOU/MM3 (ref 0–0.4)
ERYTHROCYTE [DISTWIDTH] IN BLOOD BY AUTOMATED COUNT: 13.5 % (ref 11.5–14.5)
ERYTHROCYTE [DISTWIDTH] IN BLOOD BY AUTOMATED COUNT: 47 FL (ref 35–45)
GFR SERPL CREATININE-BSD FRML MDRD: > 60 ML/MIN/1.73M2
GLUCOSE BLD-MCNC: 91 MG/DL (ref 70–108)
HCT VFR BLD CALC: 40.9 % (ref 37–47)
HDLC SERPL-MCNC: 44 MG/DL
HEMOGLOBIN: 13.2 GM/DL (ref 12–16)
IMMATURE GRANS (ABS): 0.01 THOU/MM3 (ref 0–0.07)
IMMATURE GRANULOCYTES: 0.2 %
LDL CHOLESTEROL CALCULATED: 138 MG/DL
LYMPHOCYTES # BLD: 36.3 %
LYMPHOCYTES ABSOLUTE: 2.1 THOU/MM3 (ref 1–4.8)
MCH RBC QN AUTO: 30.6 PG (ref 26–33)
MCHC RBC AUTO-ENTMCNC: 32.3 GM/DL (ref 32.2–35.5)
MCV RBC AUTO: 94.9 FL (ref 81–99)
MONOCYTES # BLD: 11.2 %
MONOCYTES ABSOLUTE: 0.6 THOU/MM3 (ref 0.4–1.3)
NUCLEATED RED BLOOD CELLS: 0 /100 WBC
PLATELET # BLD: 280 THOU/MM3 (ref 130–400)
PMV BLD AUTO: 12.2 FL (ref 9.4–12.4)
POTASSIUM SERPL-SCNC: 3.9 MEQ/L (ref 3.5–5.2)
RBC # BLD: 4.31 MILL/MM3 (ref 4.2–5.4)
SEG NEUTROPHILS: 48.5 %
SEGMENTED NEUTROPHILS ABSOLUTE COUNT: 2.8 THOU/MM3 (ref 1.8–7.7)
SODIUM BLD-SCNC: 142 MEQ/L (ref 135–145)
TOTAL PROTEIN: 7.4 G/DL (ref 6.1–8)
TRIGL SERPL-MCNC: 267 MG/DL (ref 0–199)
VITAMIN D 25-HYDROXY: 10 NG/ML (ref 30–100)
WBC # BLD: 5.8 THOU/MM3 (ref 4.8–10.8)

## 2023-01-12 ASSESSMENT — PATIENT HEALTH QUESTIONNAIRE - PHQ9
SUM OF ALL RESPONSES TO PHQ QUESTIONS 1-9: 0
2. FEELING DOWN, DEPRESSED OR HOPELESS: 0
SUM OF ALL RESPONSES TO PHQ QUESTIONS 1-9: 0
SUM OF ALL RESPONSES TO PHQ QUESTIONS 1-9: 0
1. LITTLE INTEREST OR PLEASURE IN DOING THINGS: 0
SUM OF ALL RESPONSES TO PHQ QUESTIONS 1-9: 0
SUM OF ALL RESPONSES TO PHQ9 QUESTIONS 1 & 2: 0

## 2023-01-12 ASSESSMENT — ENCOUNTER SYMPTOMS
VOMITING: 0
ABDOMINAL DISTENTION: 0
ANAL BLEEDING: 0
COUGH: 0
NAUSEA: 1
ABDOMINAL PAIN: 1
BLOOD IN STOOL: 0
CONSTIPATION: 0
DIARRHEA: 0
SHORTNESS OF BREATH: 0

## 2023-01-12 NOTE — PROGRESS NOTES
[address] [address]  Dept: [phone]  Dept Fax: [phone]  Loc: [phone]    Baron Choudhury is a 57 y.o. female who presents today for her medical conditions/complaints as noted below. Chief Complaint   Patient presents with    6 Month Follow-Up    Other     Right side abdomen pain, would like labs ordered, denies urinary sx- sx for 15 years pt state she had gallbladder surgery but now has piercing pain        HPI:      was utilized throughout entire visit. Patient presents to the office today for a 6-month follow-up. Hyperlipidemia:  Patient is taking atorvastatin 40 mg daily without issues, though does notice some occasional muscle aches in her legs. She is unsure if this is related to her medication. She would like to have her cholesterol rechecked at this time to see if medication is necessary. Vitamin D deficiency:  Patient is taking vitamin D 2000 units daily without issues. Patient also would like to discuss abdominal discomfort. Patient states that she has had chronic abdominal pain in the right upper region for about 15 years. Pain occurs almost daily. States that it sometimes feels like a fullness. She had her gallbladder removed about 10 years ago or so. This did not make any difference in her abdominal discomfort. States that her pain continued and she had a colonoscopy which showed possible inflammation? States that she was told she needed to have a repeat colonoscopy performed when [place] moved to [place] but this was canceled at the start of the pandemic. States that she still has burning shooting pains in her right upper abdomen which also feels inflamed at times. It is tender to press and on that area deep. Denies any current vomiting, diarrhea, or constipation.   Does have occasional nausea and heartburn from time to time for which she takes a natural remedy for. She has not had an EGD in the past.    Patient also reports that she was referred to sleep medicine for sleep study in the past but never received a call for this appointment. She would like me to resend the referral today. She does snore and has daytime fatigue. History reviewed. No pertinent past medical history. History reviewed. No pertinent surgical history. History reviewed. No pertinent family history. Social History     Tobacco Use    Smoking status: Never    Smokeless tobacco: Never   Substance Use Topics    Alcohol use: Never      Current Outpatient Medications   Medication Sig Dispense Refill    Cholecalciferol (VITAMIN D3) 50 MCG (2000 UT) TABS TOME MELL TABLETA TODOS LOS LONGO EN LA MANANA 30 tablet 2    atorvastatin (LIPITOR) 40 MG tablet TOME MELL TABLETA TODOS LOS LONGO EN LA MANANA 30 tablet 2     No current facility-administered medications for this visit. No Known Allergies    Health Maintenance   Topic Date Due    COVID-19 Vaccine (1) Never done    DTaP/Tdap/Td vaccine (1 - Tdap) Never done    Colorectal Cancer Screen  Never done    Breast cancer screen  Never done    Shingles vaccine (1 of 2) Never done    Flu vaccine (1) 08/01/2022    Hepatitis C screen  06/28/2023 (Originally 7/10/1976)    HIV screen  06/28/2023 (Originally 7/10/1973)    Lipids  06/28/2023    Depression Screen  06/28/2023    Cervical cancer screen  07/17/2024    Hepatitis A vaccine  Aged Out    Hib vaccine  Aged Out    Meningococcal (ACWY) vaccine  Aged Out    Pneumococcal 0-64 years Vaccine  Aged Out       Subjective:      Review of Systems   Constitutional:  Negative for chills and fever. Respiratory:  Negative for cough and shortness of breath. Cardiovascular:  Negative for chest pain. Gastrointestinal:  Positive for abdominal pain and nausea (occasional).  Negative for abdominal distention, anal bleeding, blood in stool, constipation, diarrhea and vomiting.        + heartburn   Musculoskeletal:  Positive for myalgias (occasional muscle aches).     Objective:     Physical Exam  Vitals and nursing note reviewed.   Constitutional:       General: She is not in acute distress.     Appearance: Normal appearance. She is well-developed. She is not ill-appearing.   HENT:      Head: Normocephalic and atraumatic.      Mouth/Throat:      Lips: Pink.      Mouth: Mucous membranes are moist.      Pharynx: Oropharynx is clear. Uvula midline.   Eyes:      General: Lids are normal.      Conjunctiva/sclera: Conjunctivae normal.   Cardiovascular:      Rate and Rhythm: Normal rate and regular rhythm.      Heart sounds: Normal heart sounds. No murmur heard.  Pulmonary:      Effort: Pulmonary effort is normal.      Breath sounds: Normal breath sounds and air entry. No wheezing, rhonchi or rales.   Abdominal:      General: Abdomen is flat. Bowel sounds are increased. There is no distension.      Palpations: Abdomen is soft.      Tenderness: There is abdominal tenderness (ild to palpation of epigastric and RUQ) in the right upper quadrant and epigastric area. There is no guarding or rebound. Negative signs include Lemus's sign.   Musculoskeletal:      Cervical back: Neck supple.   Lymphadenopathy:      Cervical: No cervical adenopathy.   Skin:     General: Skin is warm and dry.   Neurological:      General: No focal deficit present.      Mental Status: She is alert and oriented to person, place, and time.      Gait: Gait is intact.   Psychiatric:         Mood and Affect: Mood and affect normal.         Behavior: Behavior is cooperative.     /70 (Site: Left Upper Arm, Position: Sitting, Cuff Size: Medium Adult)   Pulse 60   Resp 16   Ht 5' 1\" (1.549 m)   Wt 135 lb 12.8 oz (61.6 kg)   LMP  (LMP Unknown)   SpO2 96%   BMI 25.66 kg/m²     Assessment/Plan:   Dilia was seen today for 6 month follow-up and other.    Diagnoses and all orders for this visit:    Mixed  hyperlipidemia  Comments:  Established, stable. Continue atorvastatin 40 mg at this time. Plan to recheck fasting lipid panel. Patient does have some muscle aches and cramps that may be secondary to statin use. Pending lipid panel results, can consider trialing patient off of medication to see if this improves muscle aches. Follow-up in 1 month for lab review or sooner if needed. Orders:  -     Lipid Panel; Future  -     Comprehensive Metabolic Panel; Future  -     Comprehensive Metabolic Panel  -     Lipid Panel    Vitamin D deficiency  Comments:  Established issue. Plan to recheck vitamin D level at this time. Continue vitamin D 2000 units daily for now. Orders:  -     Vitamin D 25 Hydroxy; Future  -     Vitamin D 25 Hydroxy    Right upper quadrant abdominal pain  Comments:  Chronic x 15 years, uncontrolled. Etiology unclear at this time, as patient's symptoms did not improve after prior cholecystectomy 10 years ago. Consider gastritis vs ulcer vs colitis vs constipation vs other. Plan to check CMP and CBC at this time. I will refer patient to gastroenterology for consultation, as well as screening colonoscopy and EGD. Orders:  -     Comprehensive Metabolic Panel; Future  -     CBC with Auto Differential; Future  -     [facility] - Jody Barclay MD, Gastroenterology, [facility]  -     CBC with Auto Differential  -     Comprehensive Metabolic Panel    Daytime somnolence  Comments:  Chronic, uncontrolled. Associated with snoring. Plan to refer to sleep medicine for sleep study to rule out JEREMY. Orders:  -     [facility]      Return in about 4 weeks (around 2/9/2023).     Electronically signed by Kenji Cruz DO on 1/12/2023 at 4:47 PM

## 2023-01-19 ENCOUNTER — TELEPHONE (OUTPATIENT)
Dept: FAMILY MEDICINE CLINIC | Age: 63
End: 2023-01-19

## 2023-01-19 DIAGNOSIS — E78.2 MIXED HYPERLIPIDEMIA: ICD-10-CM

## 2023-01-19 RX ORDER — ATORVASTATIN CALCIUM 40 MG/1
TABLET, FILM COATED ORAL
Qty: 30 TABLET | Refills: 2 | Status: SHIPPED | OUTPATIENT
Start: 2023-01-19

## 2023-01-19 NOTE — TELEPHONE ENCOUNTER
----- Message from Stevie Bowen DO sent at 1/16/2023  8:06 AM EST -----  Please let patient know that labs showed:    1. Vitamin D is very low at 10. Is she still taking her vitamin D supplement? 2. Blood counts, liver, and kidney function is normal.  3. Cholesterol is still high. I would recommend that she continues her medication, though if she wants to trial off of this to see if her muscle cramps improve, she can try that. Please let me know. Thanks    If unable to reach patient, I can discuss with her at follow up appointment.      Dr. Jasmin Souza

## 2023-01-19 NOTE — TELEPHONE ENCOUNTER
Spoke to patient with interrupter she states she will take the cholesterol medication please send in a new script to the pharmacy.  No need to call back

## 2023-06-29 ENCOUNTER — APPOINTMENT (OUTPATIENT)
Dept: GENERAL RADIOLOGY | Age: 63
End: 2023-06-29

## 2023-06-29 ENCOUNTER — APPOINTMENT (OUTPATIENT)
Dept: INTERVENTIONAL RADIOLOGY/VASCULAR | Age: 63
End: 2023-06-29

## 2023-06-29 ENCOUNTER — HOSPITAL ENCOUNTER (EMERGENCY)
Age: 63
Discharge: HOME | End: 2023-06-29
Attending: EMERGENCY MEDICINE

## 2023-06-29 VITALS
SYSTOLIC BLOOD PRESSURE: 143 MMHG | TEMPERATURE: 98 F | BODY MASS INDEX: 26.68 KG/M2 | OXYGEN SATURATION: 98 % | RESPIRATION RATE: 18 BRPM | HEART RATE: 63 BPM | WEIGHT: 145 LBS | HEIGHT: 62 IN | DIASTOLIC BLOOD PRESSURE: 87 MMHG

## 2023-06-29 DIAGNOSIS — S39.012A STRAIN OF LUMBAR REGION, INITIAL ENCOUNTER: Primary | ICD-10-CM

## 2023-06-29 DIAGNOSIS — M79.602 LEFT ARM PAIN: ICD-10-CM

## 2023-06-29 LAB
ALBUMIN SERPL BCG-MCNC: 4.3 G/DL (ref 3.5–5.1)
ALP SERPL-CCNC: 69 U/L (ref 38–126)
ALT SERPL W/O P-5'-P-CCNC: 16 U/L (ref 11–66)
ANION GAP SERPL CALC-SCNC: 12 MEQ/L (ref 8–16)
AST SERPL-CCNC: 21 U/L (ref 5–40)
BACTERIA URNS QL MICRO: ABNORMAL /HPF
BASOPHILS ABSOLUTE: 0 THOU/MM3 (ref 0–0.1)
BASOPHILS NFR BLD AUTO: 0.8 %
BILIRUB SERPL-MCNC: 0.2 MG/DL (ref 0.3–1.2)
BILIRUB UR QL STRIP.AUTO: NEGATIVE
BUN SERPL-MCNC: 12 MG/DL (ref 7–22)
CALCIUM SERPL-MCNC: 9.6 MG/DL (ref 8.5–10.5)
CASTS #/AREA URNS LPF: ABNORMAL /LPF
CASTS 2: ABNORMAL /LPF
CHARACTER UR: ABNORMAL
CHLORIDE SERPL-SCNC: 103 MEQ/L (ref 98–111)
CO2 SERPL-SCNC: 27 MEQ/L (ref 23–33)
COLOR: YELLOW
CREAT SERPL-MCNC: 0.6 MG/DL (ref 0.4–1.2)
CRYSTALS URNS MICRO: ABNORMAL
DEPRECATED RDW RBC AUTO: 47 FL (ref 35–45)
EOSINOPHIL NFR BLD AUTO: 3.4 %
EOSINOPHILS ABSOLUTE: 0.2 THOU/MM3 (ref 0–0.4)
EPITHELIAL CELLS, UA: ABNORMAL /HPF
ERYTHROCYTE [DISTWIDTH] IN BLOOD BY AUTOMATED COUNT: 13.7 % (ref 11.5–14.5)
GFR SERPL CREATININE-BSD FRML MDRD: > 60 ML/MIN/1.73M2
GLUCOSE SERPL-MCNC: 105 MG/DL (ref 70–108)
GLUCOSE UR QL STRIP.AUTO: NEGATIVE MG/DL
HCT VFR BLD AUTO: 38.9 % (ref 37–47)
HGB BLD-MCNC: 12.5 GM/DL (ref 12–16)
HGB UR QL STRIP.AUTO: NEGATIVE
IMM GRANULOCYTES # BLD AUTO: 0.01 THOU/MM3 (ref 0–0.07)
IMM GRANULOCYTES NFR BLD AUTO: 0.2 %
KETONES UR QL STRIP.AUTO: NEGATIVE
LYMPHOCYTES ABSOLUTE: 2.4 THOU/MM3 (ref 1–4.8)
LYMPHOCYTES NFR BLD AUTO: 39.5 %
MAGNESIUM SERPL-MCNC: 2.5 MG/DL (ref 1.6–2.4)
MCH RBC QN AUTO: 30 PG (ref 26–33)
MCHC RBC AUTO-ENTMCNC: 32.1 GM/DL (ref 32.2–35.5)
MCV RBC AUTO: 93.5 FL (ref 81–99)
MISCELLANEOUS 2: ABNORMAL
MONOCYTES ABSOLUTE: 0.6 THOU/MM3 (ref 0.4–1.3)
MONOCYTES NFR BLD AUTO: 9.7 %
NEUTROPHILS NFR BLD AUTO: 46.4 %
NITRITE UR QL STRIP: NEGATIVE
NRBC BLD AUTO-RTO: 0 /100 WBC
OSMOLALITY SERPL CALC.SUM OF ELEC: 283.2 MOSMOL/KG (ref 275–300)
PH UR STRIP.AUTO: 7 [PH] (ref 5–9)
PLATELET # BLD AUTO: 275 THOU/MM3 (ref 130–400)
PMV BLD AUTO: 11 FL (ref 9.4–12.4)
POTASSIUM SERPL-SCNC: 3.7 MEQ/L (ref 3.5–5.2)
PROT SERPL-MCNC: 7.1 G/DL (ref 6.1–8)
PROT UR STRIP.AUTO-MCNC: NEGATIVE MG/DL
RBC # BLD AUTO: 4.16 MILL/MM3 (ref 4.2–5.4)
RBC URINE: ABNORMAL /HPF
RENAL EPI CELLS #/AREA URNS HPF: ABNORMAL /[HPF]
SEGMENTED NEUTROPHILS ABSOLUTE COUNT: 2.8 THOU/MM3 (ref 1.8–7.7)
SODIUM SERPL-SCNC: 142 MEQ/L (ref 135–145)
SP GR UR REFRACT.AUTO: 1.02 (ref 1–1.03)
TROPONIN T: < 0.01 NG/ML
UROBILINOGEN, URINE: 1 EU/DL (ref 0–1)
WBC # BLD AUTO: 6 THOU/MM3 (ref 4.8–10.8)
WBC #/AREA URNS HPF: ABNORMAL /HPF
WBC #/AREA URNS HPF: ABNORMAL /[HPF]
YEAST LIKE FUNGI URNS QL MICRO: ABNORMAL

## 2023-06-29 PROCEDURE — 6370000000 HC RX 637 (ALT 250 FOR IP): Performed by: EMERGENCY MEDICINE

## 2023-06-29 PROCEDURE — 72100 X-RAY EXAM L-S SPINE 2/3 VWS: CPT

## 2023-06-29 PROCEDURE — 93971 EXTREMITY STUDY: CPT

## 2023-06-29 PROCEDURE — 36415 COLL VENOUS BLD VENIPUNCTURE: CPT

## 2023-06-29 PROCEDURE — 6370000000 HC RX 637 (ALT 250 FOR IP)

## 2023-06-29 PROCEDURE — 84484 ASSAY OF TROPONIN QUANT: CPT

## 2023-06-29 PROCEDURE — 81001 URINALYSIS AUTO W/SCOPE: CPT

## 2023-06-29 PROCEDURE — 85025 COMPLETE CBC W/AUTO DIFF WBC: CPT

## 2023-06-29 PROCEDURE — 80053 COMPREHEN METABOLIC PANEL: CPT

## 2023-06-29 PROCEDURE — 87086 URINE CULTURE/COLONY COUNT: CPT

## 2023-06-29 PROCEDURE — 99284 EMERGENCY DEPT VISIT MOD MDM: CPT

## 2023-06-29 PROCEDURE — 73090 X-RAY EXAM OF FOREARM: CPT

## 2023-06-29 PROCEDURE — 83735 ASSAY OF MAGNESIUM: CPT

## 2023-06-29 RX ORDER — CYCLOBENZAPRINE HCL 10 MG
10 TABLET ORAL ONCE
Status: COMPLETED | OUTPATIENT
Start: 2023-06-29 | End: 2023-06-29

## 2023-06-29 RX ORDER — HYDROCODONE BITARTRATE AND ACETAMINOPHEN 5; 325 MG/1; MG/1
1 TABLET ORAL ONCE
Status: COMPLETED | OUTPATIENT
Start: 2023-06-29 | End: 2023-06-29

## 2023-06-29 RX ORDER — IBUPROFEN 200 MG
400 TABLET ORAL ONCE
Status: COMPLETED | OUTPATIENT
Start: 2023-06-29 | End: 2023-06-29

## 2023-06-29 RX ADMIN — HYDROCODONE BITARTRATE AND ACETAMINOPHEN 1 TABLET: 5; 325 TABLET ORAL at 20:09

## 2023-06-29 RX ADMIN — IBUPROFEN 400 MG: 200 TABLET, FILM COATED ORAL at 21:22

## 2023-06-29 RX ADMIN — CYCLOBENZAPRINE 10 MG: 10 TABLET, FILM COATED ORAL at 21:22

## 2023-06-29 ASSESSMENT — PAIN DESCRIPTION - LOCATION: LOCATION: ELBOW;BACK

## 2023-06-29 ASSESSMENT — PAIN SCALES - GENERAL: PAINLEVEL_OUTOF10: 5

## 2023-06-29 ASSESSMENT — PAIN - FUNCTIONAL ASSESSMENT: PAIN_FUNCTIONAL_ASSESSMENT: 0-10

## 2023-07-01 LAB
BACTERIA UR CULT: ABNORMAL
ORGANISM: ABNORMAL

## 2023-09-07 ENCOUNTER — COMMUNITY OUTREACH (OUTPATIENT)
Dept: FAMILY MEDICINE CLINIC | Age: 63
End: 2023-09-07

## 2023-09-07 NOTE — PROGRESS NOTES
Patient's HM shows they are overdue for Mammogram, Colorectal Screening. Care Everywhere and  files searched. No results to attach to order nor HM updated. None

## 2024-02-06 ENCOUNTER — NURSE ONLY (OUTPATIENT)
Dept: LAB | Age: 64
End: 2024-02-06

## 2024-02-22 ENCOUNTER — NURSE ONLY (OUTPATIENT)
Dept: LAB | Age: 64
End: 2024-02-22

## 2024-03-03 LAB — CYTOLOGY THIN PREP PAP: NORMAL

## 2024-06-28 RX ORDER — SODIUM CHLORIDE 9 MG/ML
INJECTION, SOLUTION INTRAVENOUS CONTINUOUS
Status: DISCONTINUED | OUTPATIENT
Start: 2024-06-28 | End: 2024-07-03 | Stop reason: HOSPADM

## 2024-06-28 RX ORDER — SODIUM CHLORIDE 0.9 % (FLUSH) 0.9 %
5-40 SYRINGE (ML) INJECTION PRN
Status: DISCONTINUED | OUTPATIENT
Start: 2024-06-28 | End: 2024-07-03 | Stop reason: HOSPADM

## 2024-06-28 RX ORDER — SODIUM CHLORIDE 0.9 % (FLUSH) 0.9 %
5-40 SYRINGE (ML) INJECTION EVERY 12 HOURS SCHEDULED
Status: DISCONTINUED | OUTPATIENT
Start: 2024-06-28 | End: 2024-07-03 | Stop reason: HOSPADM

## 2024-06-28 RX ORDER — SODIUM CHLORIDE 9 MG/ML
25 INJECTION, SOLUTION INTRAVENOUS PRN
Status: DISCONTINUED | OUTPATIENT
Start: 2024-06-28 | End: 2024-07-03 | Stop reason: HOSPADM

## 2024-07-03 ENCOUNTER — ANESTHESIA EVENT (OUTPATIENT)
Dept: ENDOSCOPY | Age: 64
End: 2024-07-03

## 2024-07-03 ENCOUNTER — HOSPITAL ENCOUNTER (OUTPATIENT)
Age: 64
Setting detail: OUTPATIENT SURGERY
Discharge: HOME | End: 2024-07-03
Attending: INTERNAL MEDICINE | Admitting: INTERNAL MEDICINE

## 2024-07-03 ENCOUNTER — ANESTHESIA (OUTPATIENT)
Dept: ENDOSCOPY | Age: 64
End: 2024-07-03

## 2024-07-03 VITALS
HEIGHT: 62 IN | RESPIRATION RATE: 16 BRPM | DIASTOLIC BLOOD PRESSURE: 74 MMHG | OXYGEN SATURATION: 96 % | BODY MASS INDEX: 25.21 KG/M2 | HEART RATE: 69 BPM | WEIGHT: 137 LBS | TEMPERATURE: 96.6 F | SYSTOLIC BLOOD PRESSURE: 147 MMHG

## 2024-07-03 PROCEDURE — 2720000010 HC SURG SUPPLY STERILE: Performed by: INTERNAL MEDICINE

## 2024-07-03 PROCEDURE — 2709999900 HC NON-CHARGEABLE SUPPLY: Performed by: INTERNAL MEDICINE

## 2024-07-03 PROCEDURE — 3700000001 HC ADD 15 MINUTES (ANESTHESIA): Performed by: INTERNAL MEDICINE

## 2024-07-03 PROCEDURE — 7100000011 HC PHASE II RECOVERY - ADDTL 15 MIN: Performed by: INTERNAL MEDICINE

## 2024-07-03 PROCEDURE — 2580000003 HC RX 258: Performed by: INTERNAL MEDICINE

## 2024-07-03 PROCEDURE — 6360000002 HC RX W HCPCS: Performed by: NURSE ANESTHETIST, CERTIFIED REGISTERED

## 2024-07-03 PROCEDURE — 3609027000 HC COLONOSCOPY: Performed by: INTERNAL MEDICINE

## 2024-07-03 PROCEDURE — 3700000000 HC ANESTHESIA ATTENDED CARE: Performed by: INTERNAL MEDICINE

## 2024-07-03 PROCEDURE — 7100000010 HC PHASE II RECOVERY - FIRST 15 MIN: Performed by: INTERNAL MEDICINE

## 2024-07-03 PROCEDURE — 2500000003 HC RX 250 WO HCPCS: Performed by: NURSE ANESTHETIST, CERTIFIED REGISTERED

## 2024-07-03 PROCEDURE — 3609017100 HC EGD: Performed by: INTERNAL MEDICINE

## 2024-07-03 RX ORDER — PROPOFOL 10 MG/ML
INJECTION, EMULSION INTRAVENOUS PRN
Status: DISCONTINUED | OUTPATIENT
Start: 2024-07-03 | End: 2024-07-03 | Stop reason: SDUPTHER

## 2024-07-03 RX ORDER — LIDOCAINE HYDROCHLORIDE 20 MG/ML
INJECTION, SOLUTION EPIDURAL; INFILTRATION; INTRACAUDAL; PERINEURAL PRN
Status: DISCONTINUED | OUTPATIENT
Start: 2024-07-03 | End: 2024-07-03 | Stop reason: SDUPTHER

## 2024-07-03 RX ADMIN — PROPOFOL 50 MG: 10 INJECTION, EMULSION INTRAVENOUS at 11:28

## 2024-07-03 RX ADMIN — LIDOCAINE HYDROCHLORIDE 100 MG: 20 INJECTION, SOLUTION EPIDURAL; INFILTRATION; INTRACAUDAL; PERINEURAL at 11:24

## 2024-07-03 RX ADMIN — SODIUM CHLORIDE: 9 INJECTION, SOLUTION INTRAVENOUS at 10:28

## 2024-07-03 RX ADMIN — PROPOFOL 50 MG: 10 INJECTION, EMULSION INTRAVENOUS at 11:33

## 2024-07-03 RX ADMIN — PROPOFOL 50 MG: 10 INJECTION, EMULSION INTRAVENOUS at 11:25

## 2024-07-03 RX ADMIN — PROPOFOL 50 MG: 10 INJECTION, EMULSION INTRAVENOUS at 11:35

## 2024-07-03 RX ADMIN — PROPOFOL 50 MG: 10 INJECTION, EMULSION INTRAVENOUS at 11:24

## 2024-07-03 ASSESSMENT — PAIN - FUNCTIONAL ASSESSMENT
PAIN_FUNCTIONAL_ASSESSMENT: NONE - DENIES PAIN

## 2024-07-03 NOTE — PROGRESS NOTES
admitted to Endo department and admitted to Endo room 5  Plan of care reviewed with patient.   Call light within reach.   Bed in lowest position, locked, with one bed rail up.   Appropriate arm bands on patient.   Bathroom offered.   All questions and concerns of patient addressed.    Patient states her bowel movements are still dark in color. Dr. Scruggs notified This RN gave tap water enema per Dr. Scruggs until clear stool.     Name: Gridy  Relationship to patient: granddaughter

## 2024-07-03 NOTE — OP NOTE
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  Plan today for EGD and colonoscopy to evaluate.    ASA CLASSIFICATION:  See Anesthesia note.    ESTIMATED BLOOD LOSS:  None.    DESCRIPTION OF PROCEDURE:  The patient was brought to the GI lab.  Consent obtained.  Risks involved with the procedure were explained to the patient.  Informed consent was obtained.  The patient was monitored during the procedure with pulse oximetry, blood pressure monitoring, and oxygen by nasal cannula.  Sedation by incremental doses of IV propofol was given by Anesthesia Service to achieve monitored anesthesia care.  For ASA classification and medications given during the procedure, please see Anesthesia note.    PROCEDURE PERFORMED:   EGD.    A standard video upper scope advanced under direct vision from oral cavity up to the duodenum.  The esophagus featured acid reflux.  No erosions or ulcerations seen.  Scope advanced to the stomach.  Retroflexed examination of the cardia revealed normal cardia.  Antrum appeared normal.  Pylorus appears normal.  Duodenum appears normal.  The scope withdrawn with no immediate complications.    IMPRESSION:  Normal esophagogastroduodenoscopy.    PLAN:  Follow up with GI for further evaluation.    PROCEDURE PERFORMED:  Colonoscopy.    Digital examination revealed normal rectum.  Standard colonoscope advanced under direct vision from rectum up to the cecum.  Prep was good.  The patient

## 2024-07-03 NOTE — PROGRESS NOTES
EGD completed,No interventions.  pictures taken.     Colonoscopy completed, No interventions.  pictures taken.     Pt tolerated well.     Scope  Used.   Scope  Used.

## 2024-07-03 NOTE — BRIEF OP NOTE
Brief Postoperative Note      Patient: Mariah Amaral  YOB: 1960  MRN: [id number]    Date of Procedure: 7/3/2024    Pre-Op Diagnosis Codes:     * History of colon polyps [Z86.010]     * Gastroesophageal reflux disease, unspecified whether esophagitis present [K21.9]     * History of gastrointestinal stromal tumor (GIST) [Z85.09]    Post-Op Diagnosis: Normal EGD and normal colonoscopy       Procedure(s):  COLONOSCOPY  ESOPHAGOGASTRODUODENOSCOPY    Surgeon(s):  Manuel Scruggs MD    Assistant:  * No surgical staff found *    Anesthesia: Monitor Anesthesia Care    Estimated Blood Loss (mL): none    Complications: None    Specimens:   * No specimens in log *    Implants:  * No implants in log *      Drains: * No LDAs found *    Findings: Normal EGD and normal colonoscopy  Infection Present At Time Of Surgery (PATOS) (choose all levels that have infection present):  No infection present  Other Findings:      Electronically signed by Manuel Scruggs MD on 7/3/2024 at 11:48 AM

## 2024-07-03 NOTE — ANESTHESIA POSTPROCEDURE EVALUATION
Department of Anesthesiology  Postprocedure Note    Patient: Mariah Amaral  MRN: [id number]  YOB: 1960  Date of evaluation: 7/3/2024    Procedure Summary       Date: 07/03/24 Room / Location: [place] [address] / [facility]    Anesthesia Start: 1121 Anesthesia Stop: 1144    Procedures:       COLONOSCOPY      ESOPHAGOGASTRODUODENOSCOPY Diagnosis:       History of colon polyps      Gastroesophageal reflux disease, unspecified whether esophagitis present      History of gastrointestinal stromal tumor (GIST)      (History of colon polyps [Z86.010])      (Gastroesophageal reflux disease, unspecified whether esophagitis present [K21.9])      (History of gastrointestinal stromal tumor (GIST) [Z85.09])    Surgeons: Manuel Scruggs MD Responsible Provider: Jose Francisco Raya DO    Anesthesia Type: MAC, TIVA ASA Status: 2            Anesthesia Type: No value filed.    Cedric Phase I: Cedric Score: 10    Cedric Phase II:      Anesthesia Post Evaluation    Patient location during evaluation: bedside  Patient participation: complete - patient participated  Level of consciousness: responsive to verbal stimuli  Airway patency: patent  Nausea & Vomiting: no nausea and no vomiting  Cardiovascular status: hemodynamically stable  Respiratory status: acceptable, spontaneous ventilation and room air  Hydration status: euvolemic  Pain management: adequate        No notable events documented.

## 2024-07-03 NOTE — ANESTHESIA PRE PROCEDURE
\"UFH9XSH\", \"CRQ9QLF\", \"BEART\", \"Z4TDYXJX\"     Type & Screen (If Applicable):  No results found for: \"LABABO\"    Drug/Infectious Status (If Applicable):  No results found for: \"HIV\", \"HEPCAB\"    COVID-19 Screening (If Applicable):   Lab Results   Component Value Date/Time    COVID19 DETECTED 04/18/2021 08:32 PM           Anesthesia Evaluation  Patient summary reviewed and Nursing notes reviewed   no history of anesthetic complications:   Airway: Mallampati: II  TM distance: >3 FB   Neck ROM: full  Mouth opening: > = 3 FB   Dental:    (+) edentulous      Pulmonary:Negative Pulmonary ROS                              Cardiovascular:  Exercise tolerance: good (>4 METS)  (+) hyperlipidemia                  Neuro/Psych:   Negative Neuro/Psych ROS              GI/Hepatic/Renal: Neg GI/Hepatic/Renal ROS            Endo/Other:    (+) : arthritis:..                 Abdominal:             Vascular: negative vascular ROS.         Other Findings:             Anesthesia Plan      MAC and TIVA     ASA 2       Induction: intravenous.      Anesthetic plan and risks discussed with patient.      Plan discussed with attending.                    Savannah García, APRN - CRNA   7/3/2024

## 2024-07-03 NOTE — H&P
[facility]     Sedation/Analgesia History & Physical    Patient: Mariah Amaral : 1960  Med Rec#: [id number] Acc#: [id number]   Provider Performing Procedure: Manuel Scruggs MD  Primary Care Physician: Sweta Crabtree DO    PRE-PROCEDURE   Full CODE [x]Yes  DNR-CCA/DNR-CC []Yes   Brief History/Pre-Procedure Diagnosis:     Right-sided abdominal pain of unknown cause  bisacodyl (DULCOLAX) 5 MG EC tablet      polyethylene glycol (GLYCOLAX) 17 GM/SCOOP powder     COLONOSCOPY W/ OR W/O BIOPSY     ESOPHAGOSCOPY / EGD       2. GIST (gastrointestinal stroma tumor), malignant, colon (HCC)  bisacodyl (DULCOLAX) 5 MG EC tablet     polyethylene glycol (GLYCOLAX) 17 GM/SCOOP powder     COLONOSCOPY W/ OR W/O BIOPSY     ESOPHAGOSCOPY / EGD     noted on pathology of vaginal nodule during JT/BSO       3. S/P hysterectomy          4. Encounter for colonoscopy due to history of colonic polyp  bisacodyl (DULCOLAX) 5 MG EC tablet     polyethylene glycol (GLYCOLAX) 17 GM/SCOOP powder     COLONOSCOPY W/ OR W/O BIOPSY       5. Gastroesophageal reflux disease, unspecified whether esophagitis present  ESOPHAGOSCOPY / EGD          Given the diagnosis of a vulvovaginal GIST tumor, we are going to proceed with colonoscopy to rule out any other GIST tumors of the intestinal tract.  I will also recommend that the patient have an EGD to rule out any GIST tumors of the stomach or upper GI tract.  We will also evaluate her acid reflux with the EGD.  The procedures were explained to her, including risks and benefits, and time taken to answer her questions.  She is willing to proceed.  Further treatment recommendations will be determined by the EGD and colonoscopy findings.          MEDICAL HISTORY    []Additional information:       has a past medical history of Colon polyp.    SOCIAL HISTORY  Social History       Tobacco History       Smoking Status  Never      Smokeless Tobacco Use  Never

## 2024-07-03 NOTE — PROGRESS NOTES
Recovery mode. Patient denies discomfort. Passing gas, taking fluids. Dr. Scruggs discussed findings with patient using . Discharge instructions provided and understanding verbalized.

## 2024-07-23 ENCOUNTER — OFFICE VISIT (OUTPATIENT)
Dept: FAMILY MEDICINE CLINIC | Age: 64
End: 2024-07-23

## 2024-07-23 VITALS
RESPIRATION RATE: 18 BRPM | HEART RATE: 76 BPM | BODY MASS INDEX: 25.65 KG/M2 | DIASTOLIC BLOOD PRESSURE: 82 MMHG | SYSTOLIC BLOOD PRESSURE: 126 MMHG | HEIGHT: 62 IN | OXYGEN SATURATION: 97 % | WEIGHT: 139.4 LBS

## 2024-07-23 DIAGNOSIS — J02.9 SORE THROAT: Primary | ICD-10-CM

## 2024-07-23 DIAGNOSIS — R09.A2 SENSATION OF FOREIGN BODY IN THROAT: ICD-10-CM

## 2024-07-23 PROCEDURE — 99214 OFFICE O/P EST MOD 30 MIN: CPT | Performed by: STUDENT IN AN ORGANIZED HEALTH CARE EDUCATION/TRAINING PROGRAM

## 2024-07-23 PROCEDURE — 1123F ACP DISCUSS/DSCN MKR DOCD: CPT | Performed by: STUDENT IN AN ORGANIZED HEALTH CARE EDUCATION/TRAINING PROGRAM

## 2024-07-23 ASSESSMENT — ENCOUNTER SYMPTOMS
SHORTNESS OF BREATH: 0
VOICE CHANGE: 1
SORE THROAT: 1
TROUBLE SWALLOWING: 1
CONSTIPATION: 0
NAUSEA: 0
DIARRHEA: 0
VOMITING: 0
COUGH: 0

## 2024-07-23 NOTE — PROGRESS NOTES
[facility]  [facility]  [address] [facility] [address]  [phone]     Mariah Amaral is a 64 y.o. female who presents today for:  Chief Complaint   Patient presents with    Pharyngitis     Note this has been ongoing for 5 yrs, when drinking water feels like a nodule inside her throat. Saw ENT about a year ago- [facility]. States she would like another referral again.        Assessment/Plan:     Mariah was seen today for pharyngitis.    Diagnoses and all orders for this visit:    Sore throat  -     Amb External Referral To ENT    Sensation of foreign body in throat  -     Amb External Referral To ENT    Sore throat and sensation of foreign body in throat: Chronic/exacerbated, will refer to ENT as above as patient has already seen GI-Dr. Scruggs and had no significant findings.              No follow-ups on file.      Medications Prescribed:  No orders of the defined types were placed in this encounter.      No future appointments.    HPI:     HPI    64 yr Slovak speaking female with pmhx significant for colon polyp, mixed hld, oa of left knee, vitamin d deficiency who presents as a walk in visit for sore throat.     Patient uses  for this interview.     Sore throat pain for 5 years.     Feels like food is getting stuck, doesn't feel like she is able to swallow. No issues with swallowing food, pills, liquid. Does get sensation of throat clearing.     15 days ago did scope to see her stomach, they said work up with ENT needed. Nothing in the stomach.     Does have voice changes.   Subjective:      Review of Systems   Constitutional:  Negative for fatigue and fever.   HENT:  Positive for sore throat, trouble swallowing and voice change.    Respiratory:  Negative for cough and shortness of breath.    Cardiovascular:  Negative for chest pain and leg swelling.   Gastrointestinal:  Negative for constipation, diarrhea, nausea and

## 2024-11-11 ENCOUNTER — HOSPITAL ENCOUNTER (OUTPATIENT)
Dept: CT IMAGING | Age: 64
Discharge: HOME | End: 2024-11-11

## 2024-11-11 DIAGNOSIS — R22.1 NECK SWELLING: ICD-10-CM

## 2024-11-11 LAB — POC CREATININE WHOLE BLOOD: 0.7 MG/DL (ref 0.5–1.2)

## 2024-11-11 PROCEDURE — 82565 ASSAY OF CREATININE: CPT

## 2024-11-11 PROCEDURE — 6360000004 HC RX CONTRAST MEDICATION: Performed by: OTOLARYNGOLOGY

## 2024-11-11 PROCEDURE — 70491 CT SOFT TISSUE NECK W/DYE: CPT

## 2024-11-11 RX ORDER — IOPAMIDOL 755 MG/ML
75 INJECTION, SOLUTION INTRAVASCULAR
Status: COMPLETED | OUTPATIENT
Start: 2024-11-11 | End: 2024-11-11

## 2024-11-11 RX ADMIN — IOPAMIDOL 75 ML: 755 INJECTION, SOLUTION INTRAVENOUS at 11:30

## 2025-04-10 ENCOUNTER — HOSPITAL ENCOUNTER (EMERGENCY)
Age: 65
Discharge: HOME | End: 2025-04-10
Attending: EMERGENCY MEDICINE

## 2025-04-10 ENCOUNTER — APPOINTMENT (OUTPATIENT)
Dept: GENERAL RADIOLOGY | Age: 65
End: 2025-04-10

## 2025-04-10 ENCOUNTER — APPOINTMENT (OUTPATIENT)
Dept: CT IMAGING | Age: 65
End: 2025-04-10

## 2025-04-10 VITALS
HEART RATE: 77 BPM | SYSTOLIC BLOOD PRESSURE: 164 MMHG | DIASTOLIC BLOOD PRESSURE: 74 MMHG | RESPIRATION RATE: 16 BRPM | OXYGEN SATURATION: 97 % | WEIGHT: 140 LBS | BODY MASS INDEX: 25.61 KG/M2 | TEMPERATURE: 97.4 F

## 2025-04-10 DIAGNOSIS — R91.1 LEFT LOWER LOBE PULMONARY NODULE: ICD-10-CM

## 2025-04-10 DIAGNOSIS — K62.89 RECTAL PAIN: Primary | ICD-10-CM

## 2025-04-10 LAB
ALBUMIN SERPL BCG-MCNC: 4.2 G/DL (ref 3.4–4.9)
ALP SERPL-CCNC: 76 U/L (ref 38–126)
ALT SERPL W/O P-5'-P-CCNC: 21 U/L (ref 10–35)
ANION GAP SERPL CALC-SCNC: 11 MEQ/L (ref 8–16)
AST SERPL-CCNC: 26 U/L (ref 10–35)
BASOPHILS ABSOLUTE: 0.1 THOU/MM3 (ref 0–0.1)
BASOPHILS NFR BLD AUTO: 1 %
BILIRUB CONJ SERPL-MCNC: < 0.1 MG/DL (ref 0–0.2)
BILIRUB SERPL-MCNC: < 0.2 MG/DL (ref 0.3–1.2)
BILIRUB UR QL STRIP.AUTO: NEGATIVE
BUN SERPL-MCNC: 15 MG/DL (ref 8–23)
CALCIUM SERPL-MCNC: 9.6 MG/DL (ref 8.8–10.2)
CHARACTER UR: CLEAR
CHLORIDE SERPL-SCNC: 107 MEQ/L (ref 98–111)
CO2 SERPL-SCNC: 26 MEQ/L (ref 22–29)
COLOR, UA: YELLOW
CREAT SERPL-MCNC: 0.7 MG/DL (ref 0.5–0.9)
DEPRECATED RDW RBC AUTO: 45.3 FL (ref 35–45)
EOSINOPHIL NFR BLD AUTO: 2.6 %
EOSINOPHILS ABSOLUTE: 0.2 THOU/MM3 (ref 0–0.4)
ERYTHROCYTE [DISTWIDTH] IN BLOOD BY AUTOMATED COUNT: 13.2 % (ref 11.5–14.5)
GFR SERPL CREATININE-BSD FRML MDRD: > 90 ML/MIN/1.73M2
GLUCOSE SERPL-MCNC: 105 MG/DL (ref 74–109)
GLUCOSE UR QL STRIP.AUTO: NEGATIVE MG/DL
HCT VFR BLD AUTO: 37.2 % (ref 37–47)
HEMOCCULT STL QL: NEGATIVE
HGB BLD-MCNC: 12.2 GM/DL (ref 12–16)
HGB UR QL STRIP.AUTO: NEGATIVE
IMM GRANULOCYTES # BLD AUTO: 0.01 THOU/MM3 (ref 0–0.07)
IMM GRANULOCYTES NFR BLD AUTO: 0.2 %
KETONES UR QL STRIP.AUTO: NEGATIVE
LYMPHOCYTES ABSOLUTE: 2.3 THOU/MM3 (ref 1–4.8)
LYMPHOCYTES NFR BLD AUTO: 37.9 %
MCH RBC QN AUTO: 30.4 PG (ref 26–33)
MCHC RBC AUTO-ENTMCNC: 32.8 GM/DL (ref 32.2–35.5)
MCV RBC AUTO: 92.8 FL (ref 81–99)
MONOCYTES ABSOLUTE: 0.6 THOU/MM3 (ref 0.4–1.3)
MONOCYTES NFR BLD AUTO: 10.4 %
NEUTROPHILS ABSOLUTE: 3 THOU/MM3 (ref 1.8–7.7)
NEUTROPHILS NFR BLD AUTO: 47.9 %
NITRITE UR QL STRIP: NEGATIVE
NRBC BLD AUTO-RTO: 0 /100 WBC
OSMOLALITY SERPL CALC.SUM OF ELEC: 288 MOSMOL/KG (ref 275–300)
PH UR STRIP.AUTO: 8.5 [PH] (ref 5–9)
PLATELET # BLD AUTO: 286 THOU/MM3 (ref 130–400)
PMV BLD AUTO: 11.1 FL (ref 9.4–12.4)
POTASSIUM SERPL-SCNC: 4 MEQ/L (ref 3.5–5.2)
PROT SERPL-MCNC: 6.7 G/DL (ref 6.4–8.3)
PROT UR STRIP.AUTO-MCNC: NEGATIVE MG/DL
RBC # BLD AUTO: 4.01 MILL/MM3 (ref 4.2–5.4)
SODIUM SERPL-SCNC: 144 MEQ/L (ref 135–145)
SP GR UR REFRACT.AUTO: > 1.03 (ref 1–1.03)
UROBILINOGEN, URINE: 0.2 EU/DL (ref 0–1)
WBC # BLD AUTO: 6.2 THOU/MM3 (ref 4.8–10.8)
WBC #/AREA URNS HPF: NEGATIVE /[HPF]

## 2025-04-10 PROCEDURE — 81003 URINALYSIS AUTO W/O SCOPE: CPT

## 2025-04-10 PROCEDURE — 6360000004 HC RX CONTRAST MEDICATION

## 2025-04-10 PROCEDURE — 82272 OCCULT BLD FECES 1-3 TESTS: CPT

## 2025-04-10 PROCEDURE — 71046 X-RAY EXAM CHEST 2 VIEWS: CPT

## 2025-04-10 PROCEDURE — 36415 COLL VENOUS BLD VENIPUNCTURE: CPT

## 2025-04-10 PROCEDURE — 82248 BILIRUBIN DIRECT: CPT

## 2025-04-10 PROCEDURE — 80053 COMPREHEN METABOLIC PANEL: CPT

## 2025-04-10 PROCEDURE — 74177 CT ABD & PELVIS W/CONTRAST: CPT

## 2025-04-10 PROCEDURE — 6360000002 HC RX W HCPCS

## 2025-04-10 PROCEDURE — 99285 EMERGENCY DEPT VISIT HI MDM: CPT

## 2025-04-10 PROCEDURE — 85025 COMPLETE CBC W/AUTO DIFF WBC: CPT

## 2025-04-10 PROCEDURE — 96372 THER/PROPH/DIAG INJ SC/IM: CPT

## 2025-04-10 RX ORDER — IOPAMIDOL 755 MG/ML
80 INJECTION, SOLUTION INTRAVASCULAR
Status: COMPLETED | OUTPATIENT
Start: 2025-04-10 | End: 2025-04-10

## 2025-04-10 RX ORDER — KETOROLAC TROMETHAMINE 30 MG/ML
15 INJECTION, SOLUTION INTRAMUSCULAR; INTRAVENOUS ONCE
Status: COMPLETED | OUTPATIENT
Start: 2025-04-10 | End: 2025-04-10

## 2025-04-10 RX ADMIN — KETOROLAC TROMETHAMINE 15 MG: 30 INJECTION, SOLUTION INTRAMUSCULAR at 23:09

## 2025-04-10 RX ADMIN — IOPAMIDOL 80 ML: 755 INJECTION, SOLUTION INTRAVENOUS at 21:18

## 2025-04-10 ASSESSMENT — PAIN SCALES - GENERAL
PAINLEVEL_OUTOF10: 9
PAINLEVEL_OUTOF10: 9

## 2025-04-10 ASSESSMENT — PAIN - FUNCTIONAL ASSESSMENT: PAIN_FUNCTIONAL_ASSESSMENT: 0-10

## 2025-04-10 ASSESSMENT — PAIN DESCRIPTION - LOCATION: LOCATION: RECTUM

## 2025-04-10 NOTE — ED NOTES
Patient resting in bed. Respirations easy and unlabored. No distress noted. Call light within reach.  Updated on POC.

## 2025-04-10 NOTE — ED PROVIDER NOTES
[address]  [facility] [address] [phone]  In 3 days  Call and set up and appointment for follow up on the lung nodule    DISCHARGE MEDICATIONS:  New Prescriptions    MISC. DEVICES (SITZ BATH) MISC    20 Bags by Does not apply route 2 times daily for 10 days       (Please note that portions of this note were completed with a voice recognition program.  Efforts were made to edit the dictations but occasionally words aremis-transcribed.)    MD Sherin Malave Jian, MD  04/10/25 2394    
[id number]  [phone]    Call in 2 days  Call and set up an appointment for follow-up    [facility]  [address] [address] [phone]  In 3 days  Call and set up and appointment for follow up on the lung nodule      DISCHARGE MEDICATIONS:  Discharge Medication List as of 4/10/2025 11:40 PM        START taking these medications    Details   Misc. Devices (SITZ BATH) MISC 2 TIMES DAILY Starting Thu 4/10/2025, Until Sun 4/20/2025, For 10 days, Disp-1 each, R-0, Normal                    (Please note that portions of this note were completed with a voice recognition program.  Efforts were made to edit the dictations but occasionally words are mis-transcribed.)

## 2025-04-10 NOTE — ED TRIAGE NOTES
Pt to ED from home with c/o rectum pain. Pt states this started 4 days ago and is 9/10 pain at this time. Pt denies any bleeding and had BM yesterday. Pt denies any other complaints at this time. Pt has even and unlabored respirations. Call light in reach.  used during this triage.

## 2025-04-11 ENCOUNTER — TELEPHONE (OUTPATIENT)
Dept: FAMILY MEDICINE CLINIC | Age: 65
End: 2025-04-11

## 2025-05-14 DIAGNOSIS — I67.1 CEREBRAL ANEURYSM: Primary | ICD-10-CM

## 2025-05-15 ENCOUNTER — RESULTS FOLLOW-UP (OUTPATIENT)
Dept: FAMILY MEDICINE CLINIC | Age: 65
End: 2025-05-15

## 2025-05-15 NOTE — TELEPHONE ENCOUNTER
----- Message from Dr. Sweta Crabtree DO sent at 5/14/2025  3:44 PM EDT -----  Is patient still following with me for her primary care needs or does she follow with another provider? If following with me, is she seeing a neurosurgeon for an aneurysm?    Dr. Crabtree  ----- Message -----  From: Rafiq Lopez MA  Sent: 5/14/2025  11:31 AM EDT  To: Sweta Crabtree DO

## 2025-06-27 ENCOUNTER — TELEPHONE (OUTPATIENT)
Dept: NEUROLOGY | Age: 65
End: 2025-06-27

## 2025-06-27 NOTE — TELEPHONE ENCOUNTER
Voicemail left for patient to call the office back to reschedule imaging and new patient appointment as CTA H/N needs to be completed prior to new patient appointment.    Voicemail left using [employer] Interpreting line.

## (undated) DEVICE — GLOVE ORTHO 8   MSG9480

## (undated) DEVICE — BIOGUARD A/W CLEANING ADAPTER